# Patient Record
Sex: MALE | Race: WHITE | Employment: FULL TIME | URBAN - METROPOLITAN AREA
[De-identification: names, ages, dates, MRNs, and addresses within clinical notes are randomized per-mention and may not be internally consistent; named-entity substitution may affect disease eponyms.]

---

## 2024-03-09 ENCOUNTER — HOSPITAL ENCOUNTER (EMERGENCY)
Age: 44
Discharge: HOME | End: 2024-03-09
Payer: COMMERCIAL

## 2024-03-09 ENCOUNTER — APPOINTMENT (OUTPATIENT)
Dept: GENERAL RADIOLOGY | Age: 44
End: 2024-03-09
Payer: COMMERCIAL

## 2024-03-09 VITALS
WEIGHT: 255 LBS | TEMPERATURE: 98.3 F | HEIGHT: 72 IN | OXYGEN SATURATION: 97 % | DIASTOLIC BLOOD PRESSURE: 73 MMHG | HEART RATE: 90 BPM | SYSTOLIC BLOOD PRESSURE: 127 MMHG | BODY MASS INDEX: 34.54 KG/M2 | RESPIRATION RATE: 16 BRPM

## 2024-03-09 DIAGNOSIS — S62.336A CLOSED DISPLACED FRACTURE OF NECK OF FIFTH METACARPAL BONE OF RIGHT HAND, INITIAL ENCOUNTER: ICD-10-CM

## 2024-03-09 DIAGNOSIS — S62.339A CLOSED BOXER'S FRACTURE, INITIAL ENCOUNTER: Primary | ICD-10-CM

## 2024-03-09 PROCEDURE — 73130 X-RAY EXAM OF HAND: CPT

## 2024-03-09 PROCEDURE — 29125 APPL SHORT ARM SPLINT STATIC: CPT

## 2024-03-09 PROCEDURE — 99283 EMERGENCY DEPT VISIT LOW MDM: CPT

## 2024-03-09 RX ORDER — IBUPROFEN 600 MG/1
600 TABLET ORAL ONCE
Status: DISCONTINUED | OUTPATIENT
Start: 2024-03-09 | End: 2024-03-09 | Stop reason: HOSPADM

## 2024-03-09 RX ORDER — ACETAMINOPHEN 500 MG
1000 TABLET ORAL ONCE
Status: DISCONTINUED | OUTPATIENT
Start: 2024-03-09 | End: 2024-03-09 | Stop reason: HOSPADM

## 2024-03-09 ASSESSMENT — PAIN - FUNCTIONAL ASSESSMENT: PAIN_FUNCTIONAL_ASSESSMENT: 0-10

## 2024-03-09 ASSESSMENT — PAIN DESCRIPTION - ORIENTATION: ORIENTATION: RIGHT

## 2024-03-09 ASSESSMENT — PAIN SCALES - GENERAL: PAINLEVEL_OUTOF10: 7

## 2024-03-09 ASSESSMENT — PAIN DESCRIPTION - LOCATION: LOCATION: HAND

## 2024-03-09 ASSESSMENT — PAIN DESCRIPTION - PAIN TYPE: TYPE: ACUTE PAIN

## 2024-03-09 NOTE — ED NOTES
Applied an orthoglass ocl ulnar-gutter splint to the patient's injured right hand/forearm. Constructed the splint using 9\" of 3\" width orthoglass. Applied a generous layer of stockinette and webrils underneath for padding and patient comfort. Secured splint in a position of function using one 2\" and one 3\" ACE wraps. Checked CMS before and after application; remained intact. Proceeded to place affected arm in a [facility] sling. Patient tolerated all intervention well. Patient verbalizes understanding of all at home care instructions for splint and sling and had no additional questions or concerns.

## 2024-03-09 NOTE — DISCHARGE INSTRUCTIONS
X-ray shows fracture of the distal fifth metacarpal dominant right hand.  Splint applied.  Gentle upward pressure on the distal aspect.  Contact orthopedic Monday for an appointment early next week.  Ibuprofen 600 mg every 6 or 8 hours and may add Tylenol 1000 mg every 6 or 8 hours for additional pain control.  Elevate and apply ice.

## 2024-03-09 NOTE — ED PROVIDER NOTES
[facility]  ED  EMERGENCY DEPARTMENT ENCOUNTER        Pt Name: Dayton Sauer  MRN: [id number]  Birthdate 1980  Date of evaluation: 3/9/2024  Provider: Mc Barraza PA-C  PCP: No primary care provider on file.  Note Started: 8:41 AM EST 3/9/24      TIKA. I have evaluated this patient.        CHIEF COMPLAINT       Chief Complaint   Patient presents with    Hand Injury     Punched a wall       HISTORY OF PRESENT ILLNESS: 1 or more Elements     History From: Patient    Dayton Sauer is a 43 y.o. male who presents to the emergency department with wife and son.  Patient with complaint punched wall roughly 9 PM last night.  Pain distal fifth metacarpal dominant right hand.  Swelling noted.  No prior history of fracture or injury to this hand.  No other related complaints.  He comes in for evaluation.    Nursing Notes were all reviewed and agreed with or any disagreements were addressed in the HPI.    REVIEW OF SYSTEMS :      Review of Systems    Positives and Pertinent negatives as per HPI.     SURGICAL HISTORY   History reviewed. No pertinent surgical history.    CURRENTMEDICATIONS       There are no discharge medications for this patient.      ALLERGIES     Erythromycin    FAMILYHISTORY     History reviewed. No pertinent family history.     SOCIAL HISTORY       Social History     Tobacco Use    Smoking status: Former     Types: Cigarettes   Substance Use Topics    Alcohol use: Yes     Comment: social    Drug use: Not Currently       SCREENINGS                                   CIWA Assessment  BP: 127/73  Pulse: 90             PHYSICAL EXAM  1 or more Elements     ED Triage Vitals [03/09/24 0811]   BP Temp Temp Source Pulse Respirations SpO2 Height Weight - Scale   127/73 98.3 °F (36.8 °C) Oral 90 16 97 % 1.829 m (6') 115.7 kg (255 lb)       Physical Exam  Vitals and nursing note reviewed.   Constitutional:       Appearance: Normal appearance. He is well-developed. He is obese.   HENT:      diagnosis and treatment plan.      I am the Primary Clinician of Record.  FINAL IMPRESSION      1. Closed boxer's fracture, initial encounter    2. Closed displaced fracture of neck of fifth metacarpal bone of right hand, initial encounter          DISPOSITION/PLAN     DISPOSITION Decision To Discharge 03/09/2024 08:43:04 AM      PATIENT REFERRED TO:  Natali Barbour MD  [address]  [facility] [address]  [phone]    Schedule an appointment as soon as possible for a visit in 3 days      Your healthcare provider    Schedule an appointment as soon as possible for a visit   As needed    [facility]  ED  [address] [facility] [address] [phone]  Go to   If symptoms worsen      DISCHARGE MEDICATIONS:  There are no discharge medications for this patient.      DISCONTINUED MEDICATIONS:  There are no discharge medications for this patient.             (Please note that portions of this note were completed with a voice recognition program.  Efforts were made to edit the dictations but occasionally words are mis-transcribed.)    Mc Barraza PA-C (electronically signed)        Mc Barraza PA-C  03/09/24 1128

## 2024-03-11 ENCOUNTER — TELEPHONE (OUTPATIENT)
Dept: ORTHOPEDIC SURGERY | Age: 44
End: 2024-03-11

## 2024-03-12 ENCOUNTER — TELEPHONE (OUTPATIENT)
Dept: ORTHOPEDIC SURGERY | Age: 44
End: 2024-03-12

## 2024-03-12 ENCOUNTER — PREP FOR PROCEDURE (OUTPATIENT)
Dept: ORTHOPEDIC SURGERY | Age: 44
End: 2024-03-12

## 2024-03-12 ENCOUNTER — OFFICE VISIT (OUTPATIENT)
Dept: ORTHOPEDIC SURGERY | Age: 44
End: 2024-03-12
Payer: COMMERCIAL

## 2024-03-12 VITALS — BODY MASS INDEX: 34.54 KG/M2 | WEIGHT: 255 LBS | HEIGHT: 72 IN

## 2024-03-12 DIAGNOSIS — S62.316A CLOSED DISPLACED FRACTURE OF BASE OF FIFTH METACARPAL BONE OF RIGHT HAND, INITIAL ENCOUNTER: Primary | ICD-10-CM

## 2024-03-12 PROBLEM — S62.306A FRACTURE OF FIFTH METACARPAL BONE OF RIGHT HAND: Status: ACTIVE | Noted: 2024-03-12

## 2024-03-12 PROCEDURE — L3809 WHFO W/O JOINTS PRE OTS: HCPCS | Performed by: ORTHOPAEDIC SURGERY

## 2024-03-12 PROCEDURE — 1036F TOBACCO NON-USER: CPT | Performed by: ORTHOPAEDIC SURGERY

## 2024-03-12 PROCEDURE — 99204 OFFICE O/P NEW MOD 45 MIN: CPT | Performed by: ORTHOPAEDIC SURGERY

## 2024-03-12 PROCEDURE — G8427 DOCREV CUR MEDS BY ELIG CLIN: HCPCS | Performed by: ORTHOPAEDIC SURGERY

## 2024-03-12 PROCEDURE — G8417 CALC BMI ABV UP PARAM F/U: HCPCS | Performed by: ORTHOPAEDIC SURGERY

## 2024-03-12 PROCEDURE — G8484 FLU IMMUNIZE NO ADMIN: HCPCS | Performed by: ORTHOPAEDIC SURGERY

## 2024-03-12 NOTE — PROGRESS NOTES
educated and fit by a healthcare professional with expert knowledge and specialization in brace application while under the direct supervision of the physician.  Verbal and written instructions for the use of and application of this item were provided.   They were instructed to contact the office immediately should the brace result in increased pain, decreased sensation, increased swelling or worsening of the condition.       Natali Barbour MD

## 2024-03-13 ENCOUNTER — TELEPHONE (OUTPATIENT)
Dept: ADMINISTRATIVE | Age: 44
End: 2024-03-13

## 2024-03-13 NOTE — TELEPHONE ENCOUNTER
Auth: # [id number]     Date Range: 3/14/2024 - 6/12/2024  Type of SX:  Outpatient  Location: [facility]  CPT: 09288   DX Code: S62.306A  SX area: Right Hand  Insurance: [facility]

## 2024-03-14 ENCOUNTER — ANESTHESIA (OUTPATIENT)
Dept: OPERATING ROOM | Age: 44
End: 2024-03-14
Payer: COMMERCIAL

## 2024-03-14 ENCOUNTER — HOSPITAL ENCOUNTER (OUTPATIENT)
Age: 44
Setting detail: OUTPATIENT SURGERY
Discharge: HOME | End: 2024-03-14
Attending: ORTHOPAEDIC SURGERY | Admitting: ORTHOPAEDIC SURGERY
Payer: COMMERCIAL

## 2024-03-14 ENCOUNTER — APPOINTMENT (OUTPATIENT)
Dept: GENERAL RADIOLOGY | Age: 44
End: 2024-03-14
Attending: ORTHOPAEDIC SURGERY
Payer: COMMERCIAL

## 2024-03-14 ENCOUNTER — ANESTHESIA EVENT (OUTPATIENT)
Dept: OPERATING ROOM | Age: 44
End: 2024-03-14
Payer: COMMERCIAL

## 2024-03-14 VITALS
DIASTOLIC BLOOD PRESSURE: 81 MMHG | RESPIRATION RATE: 11 BRPM | SYSTOLIC BLOOD PRESSURE: 115 MMHG | BODY MASS INDEX: 34.13 KG/M2 | HEART RATE: 73 BPM | TEMPERATURE: 97.5 F | WEIGHT: 252 LBS | OXYGEN SATURATION: 93 % | HEIGHT: 72 IN

## 2024-03-14 DIAGNOSIS — S62.326A CLOSED DISPLACED FRACTURE OF SHAFT OF FIFTH METACARPAL BONE OF RIGHT HAND, INITIAL ENCOUNTER: Primary | ICD-10-CM

## 2024-03-14 PROCEDURE — 2580000003 HC RX 258: Performed by: ANESTHESIOLOGY

## 2024-03-14 PROCEDURE — 2500000003 HC RX 250 WO HCPCS: Performed by: NURSE ANESTHETIST, CERTIFIED REGISTERED

## 2024-03-14 PROCEDURE — 7100000010 HC PHASE II RECOVERY - FIRST 15 MIN: Performed by: ORTHOPAEDIC SURGERY

## 2024-03-14 PROCEDURE — 3700000001 HC ADD 15 MINUTES (ANESTHESIA): Performed by: ORTHOPAEDIC SURGERY

## 2024-03-14 PROCEDURE — 6360000002 HC RX W HCPCS: Performed by: NURSE ANESTHETIST, CERTIFIED REGISTERED

## 2024-03-14 PROCEDURE — 73120 X-RAY EXAM OF HAND: CPT

## 2024-03-14 PROCEDURE — C1713 ANCHOR/SCREW BN/BN,TIS/BN: HCPCS | Performed by: ORTHOPAEDIC SURGERY

## 2024-03-14 PROCEDURE — 2720000010 HC SURG SUPPLY STERILE: Performed by: ORTHOPAEDIC SURGERY

## 2024-03-14 PROCEDURE — 2580000003 HC RX 258: Performed by: ORTHOPAEDIC SURGERY

## 2024-03-14 PROCEDURE — 3700000000 HC ANESTHESIA ATTENDED CARE: Performed by: ORTHOPAEDIC SURGERY

## 2024-03-14 PROCEDURE — 2709999900 HC NON-CHARGEABLE SUPPLY: Performed by: ORTHOPAEDIC SURGERY

## 2024-03-14 PROCEDURE — 6360000002 HC RX W HCPCS: Performed by: ANESTHESIOLOGY

## 2024-03-14 PROCEDURE — 6370000000 HC RX 637 (ALT 250 FOR IP): Performed by: ANESTHESIOLOGY

## 2024-03-14 PROCEDURE — 7100000000 HC PACU RECOVERY - FIRST 15 MIN: Performed by: ORTHOPAEDIC SURGERY

## 2024-03-14 PROCEDURE — 6360000002 HC RX W HCPCS: Performed by: ORTHOPAEDIC SURGERY

## 2024-03-14 PROCEDURE — 3600000014 HC SURGERY LEVEL 4 ADDTL 15MIN: Performed by: ORTHOPAEDIC SURGERY

## 2024-03-14 PROCEDURE — A4217 STERILE WATER/SALINE, 500 ML: HCPCS | Performed by: ORTHOPAEDIC SURGERY

## 2024-03-14 PROCEDURE — 3600000004 HC SURGERY LEVEL 4 BASE: Performed by: ORTHOPAEDIC SURGERY

## 2024-03-14 PROCEDURE — 7100000011 HC PHASE II RECOVERY - ADDTL 15 MIN: Performed by: ORTHOPAEDIC SURGERY

## 2024-03-14 PROCEDURE — 7100000001 HC PACU RECOVERY - ADDTL 15 MIN: Performed by: ORTHOPAEDIC SURGERY

## 2024-03-14 DEVICE — 2.3 M VARIAX HAND LOCK T-PLATE, NARROW
Type: IMPLANTABLE DEVICE | Site: HAND | Status: FUNCTIONAL
Brand: VARIAX

## 2024-03-14 DEVICE — BONE SCREW, T6
Type: IMPLANTABLE DEVICE | Site: HAND | Status: FUNCTIONAL
Brand: VARIAX

## 2024-03-14 DEVICE — LOCKING SCREW, T6
Type: IMPLANTABLE DEVICE | Site: HAND | Status: FUNCTIONAL
Brand: VARIAX

## 2024-03-14 RX ORDER — SCOLOPAMINE TRANSDERMAL SYSTEM 1 MG/1
1 PATCH, EXTENDED RELEASE TRANSDERMAL
Status: DISCONTINUED | OUTPATIENT
Start: 2024-03-14 | End: 2024-03-14 | Stop reason: HOSPADM

## 2024-03-14 RX ORDER — SODIUM CHLORIDE 9 MG/ML
INJECTION, SOLUTION INTRAVENOUS PRN
Status: DISCONTINUED | OUTPATIENT
Start: 2024-03-14 | End: 2024-03-14 | Stop reason: HOSPADM

## 2024-03-14 RX ORDER — SODIUM CHLORIDE 0.9 % (FLUSH) 0.9 %
5-40 SYRINGE (ML) INJECTION EVERY 12 HOURS SCHEDULED
Status: DISCONTINUED | OUTPATIENT
Start: 2024-03-14 | End: 2024-03-14 | Stop reason: HOSPADM

## 2024-03-14 RX ORDER — MIDAZOLAM HYDROCHLORIDE 1 MG/ML
INJECTION INTRAMUSCULAR; INTRAVENOUS PRN
Status: DISCONTINUED | OUTPATIENT
Start: 2024-03-14 | End: 2024-03-14 | Stop reason: SDUPTHER

## 2024-03-14 RX ORDER — SODIUM CHLORIDE 0.9 % (FLUSH) 0.9 %
5-40 SYRINGE (ML) INJECTION PRN
Status: DISCONTINUED | OUTPATIENT
Start: 2024-03-14 | End: 2024-03-14 | Stop reason: HOSPADM

## 2024-03-14 RX ORDER — ONDANSETRON 2 MG/ML
4 INJECTION INTRAMUSCULAR; INTRAVENOUS
Status: COMPLETED | OUTPATIENT
Start: 2024-03-14 | End: 2024-03-14

## 2024-03-14 RX ORDER — PROPOFOL 10 MG/ML
INJECTION, EMULSION INTRAVENOUS PRN
Status: DISCONTINUED | OUTPATIENT
Start: 2024-03-14 | End: 2024-03-14 | Stop reason: SDUPTHER

## 2024-03-14 RX ORDER — HYDROMORPHONE HYDROCHLORIDE 2 MG/ML
0.5 INJECTION, SOLUTION INTRAMUSCULAR; INTRAVENOUS; SUBCUTANEOUS EVERY 5 MIN PRN
Status: COMPLETED | OUTPATIENT
Start: 2024-03-14 | End: 2024-03-14

## 2024-03-14 RX ORDER — DEXAMETHASONE SODIUM PHOSPHATE 4 MG/ML
INJECTION, SOLUTION INTRA-ARTICULAR; INTRALESIONAL; INTRAMUSCULAR; INTRAVENOUS; SOFT TISSUE PRN
Status: DISCONTINUED | OUTPATIENT
Start: 2024-03-14 | End: 2024-03-14 | Stop reason: SDUPTHER

## 2024-03-14 RX ORDER — TRAMADOL HYDROCHLORIDE 50 MG/1
50 TABLET ORAL EVERY 6 HOURS PRN
Qty: 12 TABLET | Refills: 0 | Status: SHIPPED | OUTPATIENT
Start: 2024-03-14 | End: 2024-03-17

## 2024-03-14 RX ORDER — ACETAMINOPHEN 500 MG
1000 TABLET ORAL ONCE
Status: COMPLETED | OUTPATIENT
Start: 2024-03-14 | End: 2024-03-14

## 2024-03-14 RX ORDER — MEPERIDINE HYDROCHLORIDE 25 MG/ML
12.5 INJECTION INTRAMUSCULAR; INTRAVENOUS; SUBCUTANEOUS EVERY 5 MIN PRN
Status: DISCONTINUED | OUTPATIENT
Start: 2024-03-14 | End: 2024-03-14 | Stop reason: HOSPADM

## 2024-03-14 RX ORDER — ONDANSETRON 2 MG/ML
INJECTION INTRAMUSCULAR; INTRAVENOUS PRN
Status: DISCONTINUED | OUTPATIENT
Start: 2024-03-14 | End: 2024-03-14 | Stop reason: SDUPTHER

## 2024-03-14 RX ORDER — BUPIVACAINE HYDROCHLORIDE 5 MG/ML
INJECTION, SOLUTION EPIDURAL; INTRACAUDAL
Status: COMPLETED | OUTPATIENT
Start: 2024-03-14 | End: 2024-03-14

## 2024-03-14 RX ORDER — OXYCODONE HYDROCHLORIDE 5 MG/1
5 TABLET ORAL
Status: COMPLETED | OUTPATIENT
Start: 2024-03-14 | End: 2024-03-14

## 2024-03-14 RX ORDER — LIDOCAINE HYDROCHLORIDE 20 MG/ML
INJECTION, SOLUTION INFILTRATION; PERINEURAL PRN
Status: DISCONTINUED | OUTPATIENT
Start: 2024-03-14 | End: 2024-03-14 | Stop reason: SDUPTHER

## 2024-03-14 RX ORDER — CEPHALEXIN 500 MG/1
500 CAPSULE ORAL 4 TIMES DAILY
Qty: 20 CAPSULE | Refills: 0 | Status: SHIPPED | OUTPATIENT
Start: 2024-03-14 | End: 2024-03-19

## 2024-03-14 RX ORDER — NALOXONE HYDROCHLORIDE 0.4 MG/ML
INJECTION, SOLUTION INTRAMUSCULAR; INTRAVENOUS; SUBCUTANEOUS PRN
Status: DISCONTINUED | OUTPATIENT
Start: 2024-03-14 | End: 2024-03-14 | Stop reason: HOSPADM

## 2024-03-14 RX ORDER — FENTANYL CITRATE 50 UG/ML
INJECTION, SOLUTION INTRAMUSCULAR; INTRAVENOUS PRN
Status: DISCONTINUED | OUTPATIENT
Start: 2024-03-14 | End: 2024-03-14 | Stop reason: SDUPTHER

## 2024-03-14 RX ORDER — HYDROMORPHONE HYDROCHLORIDE 2 MG/ML
0.25 INJECTION, SOLUTION INTRAMUSCULAR; INTRAVENOUS; SUBCUTANEOUS EVERY 5 MIN PRN
Status: COMPLETED | OUTPATIENT
Start: 2024-03-14 | End: 2024-03-14

## 2024-03-14 RX ADMIN — HYDROMORPHONE HYDROCHLORIDE 0.5 MG: 2 INJECTION, SOLUTION INTRAMUSCULAR; INTRAVENOUS; SUBCUTANEOUS at 08:24

## 2024-03-14 RX ADMIN — OXYCODONE 5 MG: 5 TABLET ORAL at 08:51

## 2024-03-14 RX ADMIN — FENTANYL CITRATE 25 MCG: 50 INJECTION, SOLUTION INTRAMUSCULAR; INTRAVENOUS at 07:44

## 2024-03-14 RX ADMIN — ACETAMINOPHEN 1000 MG: 500 TABLET ORAL at 06:40

## 2024-03-14 RX ADMIN — SODIUM CHLORIDE: 9 INJECTION, SOLUTION INTRAVENOUS at 06:42

## 2024-03-14 RX ADMIN — DEXAMETHASONE SODIUM PHOSPHATE 4 MG: 4 INJECTION, SOLUTION INTRAMUSCULAR; INTRAVENOUS at 07:37

## 2024-03-14 RX ADMIN — LIDOCAINE HYDROCHLORIDE 100 MG: 20 INJECTION, SOLUTION INFILTRATION; PERINEURAL at 07:37

## 2024-03-14 RX ADMIN — HYDROMORPHONE HYDROCHLORIDE 0.25 MG: 2 INJECTION, SOLUTION INTRAMUSCULAR; INTRAVENOUS; SUBCUTANEOUS at 08:39

## 2024-03-14 RX ADMIN — HYDROMORPHONE HYDROCHLORIDE 0.5 MG: 2 INJECTION, SOLUTION INTRAMUSCULAR; INTRAVENOUS; SUBCUTANEOUS at 08:30

## 2024-03-14 RX ADMIN — FENTANYL CITRATE 25 MCG: 50 INJECTION, SOLUTION INTRAMUSCULAR; INTRAVENOUS at 07:42

## 2024-03-14 RX ADMIN — ONDANSETRON 4 MG: 2 INJECTION INTRAMUSCULAR; INTRAVENOUS at 07:37

## 2024-03-14 RX ADMIN — ONDANSETRON 4 MG: 2 INJECTION INTRAMUSCULAR; INTRAVENOUS at 08:41

## 2024-03-14 RX ADMIN — HYDROMORPHONE HYDROCHLORIDE 0.25 MG: 2 INJECTION, SOLUTION INTRAMUSCULAR; INTRAVENOUS; SUBCUTANEOUS at 08:44

## 2024-03-14 RX ADMIN — CEFAZOLIN 2000 MG: 2 INJECTION, POWDER, FOR SOLUTION INTRAMUSCULAR; INTRAVENOUS at 06:43

## 2024-03-14 RX ADMIN — FENTANYL CITRATE 25 MCG: 50 INJECTION, SOLUTION INTRAMUSCULAR; INTRAVENOUS at 07:41

## 2024-03-14 RX ADMIN — MIDAZOLAM 2 MG: 1 INJECTION INTRAMUSCULAR; INTRAVENOUS at 07:32

## 2024-03-14 RX ADMIN — PROPOFOL 200 MG: 10 INJECTION, EMULSION INTRAVENOUS at 07:37

## 2024-03-14 RX ADMIN — FENTANYL CITRATE 25 MCG: 50 INJECTION, SOLUTION INTRAMUSCULAR; INTRAVENOUS at 07:43

## 2024-03-14 ASSESSMENT — PAIN DESCRIPTION - LOCATION
LOCATION: ARM

## 2024-03-14 ASSESSMENT — PAIN DESCRIPTION - DESCRIPTORS
DESCRIPTORS: SHARP
DESCRIPTORS: ACHING
DESCRIPTORS: SHARP
DESCRIPTORS: SHARP

## 2024-03-14 ASSESSMENT — PAIN - FUNCTIONAL ASSESSMENT
PAIN_FUNCTIONAL_ASSESSMENT: 0-10
PAIN_FUNCTIONAL_ASSESSMENT: PREVENTS OR INTERFERES WITH MANY ACTIVE NOT PASSIVE ACTIVITIES

## 2024-03-14 ASSESSMENT — PAIN DESCRIPTION - ORIENTATION
ORIENTATION: RIGHT

## 2024-03-14 ASSESSMENT — PAIN SCALES - GENERAL
PAINLEVEL_OUTOF10: 6
PAINLEVEL_OUTOF10: 8
PAINLEVEL_OUTOF10: 5
PAINLEVEL_OUTOF10: 8
PAINLEVEL_OUTOF10: 5
PAINLEVEL_OUTOF10: 6

## 2024-03-14 ASSESSMENT — ENCOUNTER SYMPTOMS: SHORTNESS OF BREATH: 0

## 2024-03-14 NOTE — H&P
Preoperative H&P Update    The patient's History and Physical in the medical record from 3/12/2024 was reviewed by me today.    Past Medical History:   Diagnosis Date    Asthma     AS A CHILD     Past Surgical History:   Procedure Laterality Date    DENTAL SURGERY       No current facility-administered medications on file prior to encounter.     Current Outpatient Medications on File Prior to Encounter   Medication Sig Dispense Refill    Multiple Vitamin (MULTIVITAMIN ADULT PO) Take by mouth         Allergies   Allergen Reactions    Erythromycin       I reviewed the HPI, medications, allergies, reason for surgery, diagnosis and treatment plan and there has been no change.    The patient was evaluated by me today. Physical exam findings for this update include:    Vitals:    03/14/24 0625   BP: 122/81   Pulse: 75   Resp: 16   Temp: 97.9 °F (36.6 °C)   SpO2: 96%     Airway is intact  Chest: chest clear, no wheezing, rales, normal symmetric air entry, no tachypnea, retractions or cyanosis  Heart: regular rate and rhythm ; heart sounds normal  Findings on exam of the body region where surgery is to be performed include:  Right hand pain/ 5th MC shaft displaced fracture.     Electronically signed by Natali Barbour MD on 3/14/2024 at 6:50 AM

## 2024-03-14 NOTE — PROGRESS NOTES
Discharge instructions review with patient and wife. All home medications have been reviewed, pt v/u. Discharge instructions signed. Pt discharged via wheelchair. Pt discharged with dressing CDI, medications given to wife from pharmacy and all belongings. wife taking stable pt home.     
Pt arrived from OR to PACU bay 5. Reported received from OR RN/CRNA staff. Pt  arousable to voice. Surgical incisions dressings in place to right hand. Pt on RA, NSR, and VSS. Will continue to monitor.    
Pt awake and alert at this time. Pt on RA, and VSS. Pt tolerating PO. Stated pain was imprmoved Skin warm to right arm, palpable pulses and able to wiggle fingers. Pt meets criteria to be discharged from Phase 1.      
Teaching / education initiated regarding perioperative experience, expectations, and pain management during stay. Patient verbalized understanding.    
and insurance card.             19.  Visit our web site for additional information:  [email or web address]              20.During flu season no children under the age of 14 are permitted in the hospital for the safety of all patients.                              21. If you take a long acting insulin in the evening only  take half of your usual  dose the night  before your procedure              22. If you use a c-pap please bring DOS if staying overnight,             23.For your convenience [facility] has a pharmacy on site to fill your prescriptions.             24. If you use oxygen and have a portable tank please bring it  with you the DOS             25. Bring a complete list of all your medications with name and dose include any supplements.             26. Other__________________________________________   *Please call pre admission testing if you any further questions   [place]         [phone]   [place] [phone]   [place]            [phone]    [facility]  [phone]   [facility]   [phone]       VISITOR POLICY(subject to change)    Current policy is 2 visitors per patient. No children. Mask is  at the discretion of the facility. Visiting hours are 8a-8p. Overnight visitors will be at the discretion of the nurse. All policies subject to change.      All above information reviewed with patient in person or by phone.Patient verbalizes understanding.All questions and concerns addressed.                                                                                                 Patient/Rep___PATIENT_________________                                                                                                                                    PRE OP INSTRUCTIONS

## 2024-03-14 NOTE — ANESTHESIA PRE PROCEDURE
Department of Anesthesiology  Preprocedure Note       Name:  Dayton Sauer   Age:  43 y.o.  :  1980                                          MRN:  [id number]         Date:  3/14/2024      Surgeon: Surgeon(s):  Natali Barbour MD    Procedure: Procedure(s):  RIGHT FIFTH METACARPAL OPEN REDUCTION INTERNAL FIXATION-SEDRICK    Medications prior to admission:   Prior to Admission medications    Medication Sig Start Date End Date Taking? Authorizing Provider   Multiple Vitamin (MULTIVITAMIN ADULT PO) Take by mouth   Yes Provider, MD Kris       Current medications:    Current Facility-Administered Medications   Medication Dose Route Frequency Provider Last Rate Last Admin    scopolamine (TRANSDERM-SCOP) transdermal patch 1 patch  1 patch TransDERmal Q72H Hardy Connor MD           Allergies:    Allergies   Allergen Reactions    Erythromycin        Problem List:    Patient Active Problem List   Diagnosis Code    Closed displaced fracture of base of fifth metacarpal bone of right hand S62.316A    Fracture of fifth metacarpal bone of right hand S62.306A       Past Medical History:        Diagnosis Date    Asthma     AS A CHILD       Past Surgical History:        Procedure Laterality Date    DENTAL SURGERY         Social History:    Social History     Tobacco Use    Smoking status: Former     Types: Cigarettes    Smokeless tobacco: Never   Substance Use Topics    Alcohol use: Yes     Comment: social                                Counseling given: Not Answered      Vital Signs (Current):   Vitals:    24 0818 24 0625   BP:  122/81   Pulse:  75   Resp:  16   Temp:  97.9 °F (36.6 °C)   TempSrc:  Tympanic   SpO2:  96%   Weight: 115.7 kg (255 lb) 114.3 kg (252 lb)   Height: 1.829 m (6') 1.829 m (6')                                              BP Readings from Last 3 Encounters:   24 122/81   24 127/73       NPO Status:

## 2024-03-14 NOTE — BRIEF OP NOTE
Brief Postoperative Note      Patient: Dayton Sauer  YOB: 1980  MRN: [id number]    Date of Procedure: 3/14/2024    Pre-Op Diagnosis Codes:     * Fracture of fifth metacarpal bone of right hand [S62.306A]    Post-Op Diagnosis: Same       Procedure(s):  RIGHT FIFTH METACARPAL OPEN REDUCTION INTERNAL FIXATION-SEDRICK    Surgeon(s):  Natali Barbour MD    Assistant: DEJON Tineo    Anesthesia: General    Estimated Blood Loss (mL): Minimal    Complications: None    Specimens:   * No specimens in log *    Implants:  Implant Name Type Inv. Item Serial No.  Lot No. LRB No. Used Action   PLATE BNE M H1.5MM 6 H HND TI DMITRY T BATSHEVA FOR 2.3MM SCR - [id number]  PLATE BNE M H1.5MM 6 H HND TI DMITRY T BATSHEVA FOR 2.3MM SCR  [employer] [facility]  Right 1 Implanted   SCREW BNE L11MM DIA2.3MM T6 VAR[employer] - [id number]  SCREW BNE L11MM DIA2.3MM T6 [employer]  [employer] [facility]  Right 1 Implanted   SCREW BONE VARIAX 2.3MM 9MM LK TI T6 DRIVE - [id number]  SCREW BONE VARIAX 2.3MM 9MM LK TI T6 DRIVE  [employer] [facility]  Right 2 Implanted   SCREW BNE L10MM DIA2.3MM T6 [employer] - [id number]  SCREW BNE L10MM DIA2.3MM T6 [employer]X  [employer] [facility]  Right 1 Implanted   SCREW BONE VARIAX LK 2.3X11MM - [id number]  SCREW BONE VARIAX LK 2.3X11MM  [employer] [facility]  Right 2 Implanted         Drains: * No LDAs found *    Findings: Same.      Electronically signed by Natali Barbour MD on 3/14/2024 at 6:20 PM

## 2024-03-14 NOTE — DISCHARGE INSTRUCTIONS
Post op instruction:  1- D/C home  2- Dx Right hand pain/ 5th MC shaft displaced fracture.   3- NWB right hand  4- Elevation surgical site, with ice  5- Keep splint dry and clean  6- F/U in 2 weeks.       Natali Barbour MD, 3/14/2024        ANESTHESIA DISCHARGE INSTRUCTIONS    Wear your seatbelt home.  You are under the influence of drugs-do not drink alcohol,drive,operate machinery,or make any important decisions or sign any legal documentsfor 24 hours  A responsible adult needs to be with you for 24 hours.  You may experience lightheadedness,dizziness,or sleepiness following surgery.  Rest at home today- increase activity as tolerated.  Progress slowly to a regular diet unless your physician has instructed you otherwise.Drink plenty of water.  If nausea becomes a problem call your physician.  Coughing,sore throat,and muscle aches are other side effects of anesthesia,and should improve with time.  Do not drive,operate machinery while taking narcotics.       GENERAL SURGERY DISCHARGE INSTRUCTIONS    Follow your surgeons instructions.  Follow up with your surgeon as directed.  Observe the operative area for signs of excessive bleeding.If needed apply pressure,elevate if able and contact your surgeon.  Observe the operative site for any signs of infection- such as increased pain,redness,fever greater than 101 degrees,swelling, foul odor or drainage.Contact your surgeon if any of these symptoms are present.  Keep operative site clean and dry.  Do not remove dressing unless instructed to by surgeon.  Apply ice as directed.  Avoid pulling,pushing or tugging to suture line.  If you become short of breath call your doctor or go to the ER.  Take medications as directed.  Pain medication should be taken with food.  Do not drive or operate machinery while taking narcotics.  For any problems or question call your surgeon.

## 2024-03-14 NOTE — ANESTHESIA POSTPROCEDURE EVALUATION
Department of Anesthesiology  Postprocedure Note    Patient: Dayton Sauer  MRN: [id number]  YOB: 1980  Date of evaluation: 3/14/2024    Procedure Summary       Date: 03/14/24 Room / Location: [address]    Anesthesia Start: 0733 Anesthesia Stop: 0821    Procedure: RIGHT FIFTH METACARPAL OPEN REDUCTION INTERNAL FIXATION-SEDRICK (Right: Hand) Diagnosis:       Fracture of fifth metacarpal bone of right hand      (Fracture of fifth metacarpal bone of right hand [S62.306A])    Surgeons: Natali Barbour MD Responsible Provider: Hardy Connor MD    Anesthesia Type: general ASA Status: 2            Anesthesia Type: No value filed.    Zoraida Phase I: Zoraida Score: 10    Zoraida Phase II: Zoraida Score: 10    Anesthesia Post Evaluation    Patient location during evaluation: PACU  Patient participation: complete - patient participated  Level of consciousness: awake  Airway patency: patent  Nausea & Vomiting: no nausea and no vomiting  Cardiovascular status: hemodynamically stable  Respiratory status: acceptable  Hydration status: stable  Multimodal analgesia pain management approach  Pain management: adequate    No notable events documented.

## 2024-03-15 ENCOUNTER — TELEPHONE (OUTPATIENT)
Dept: ORTHOPEDIC SURGERY | Age: 44
End: 2024-03-15

## 2024-03-15 NOTE — OP NOTE
area.  Careful dissection was performed.  We identified and protected the extensor tendons.  The fracture was exposed, found to be markedly displaced.  *** fracture site.  We were able to reduce anatomically.  While maintaining the reduction, we used a T-shaped 2.3 Tritanium locking plate from the [place].  We put plate in the appropriate position in the dorsal aspect of the 5th metacarpal.  We put 1 locking screw distally *** in the shaft.  At this point, we confirmed with the mini C-arm that the fracture was anatomically reduced and the plate was in good position.  We added 2 more screws in the shaft and added 2 more locking screws distally.  Overall, we were very satisfied with the anatomic reduction and position of all of these screws.  At this point, we let the tourniquet down.  Hemostasis was secured.  We irrigated the knee copiously with normal saline mixed with gentamicin.  We closed the subcu with a 3-0 Vicryl and the skin with a 4-0 Monocryl.  Steri-Strips were then applied.  Dressing was then applied with Xeroform, 4x4s, sterile Webril, and an ulnar gutter splint was applied.    The patient tolerated the procedure well and was taken to the Recovery in stable condition.    POSTOPERATIVE PLAN:  The patient will be discharged home.  He is nonweightbearing for at least 6 weeks.  After that, we can start range of motion in 2 weeks.          ADIS DILLON MD      D:  03/14/2024 18:36:47     T:  03/14/2024 23:31:58     /GAIL  Job #:  [id number]     Doc#:  [id number]

## 2024-03-15 NOTE — TELEPHONE ENCOUNTER
Other PATIENT CALLED TO REQUEST A RETURN TO WORK NOTE STATING HE HAD SURGERY WITH SURGERY DATE. PLS INCLUDE LEFT HAND DUTY ONLY SINCE SURGERY WAS ON THE RIGHT AND FOR HOW LONG. CAN THIS BE EMAILED TO PATIENT. PLS CALL TO ADVISE. [phone] / [email or web address]

## 2024-03-18 ENCOUNTER — TELEPHONE (OUTPATIENT)
Dept: ORTHOPEDIC SURGERY | Age: 44
End: 2024-03-18

## 2024-03-18 NOTE — TELEPHONE ENCOUNTER
General Question     Subject: [employer]UDENTIAL SENDING OVER SHORT TERM DISABILITY FORMS - PLEASE LOOK FOR THEM  Patient and /or Facility Request: Dayton Sauer \"Tate\"   Contact Number: [phone]     Pt's EMPLOYER STATES HE NEEDS TO BE ON SHORT TERM DISABILITY AND THEY'RE SENDING OVER HIS FORMS. PLEASE LOOK FOR THEM AND COMPLETE, ASAP, PLEASE?

## 2024-03-20 ENCOUNTER — TELEPHONE (OUTPATIENT)
Dept: ORTHOPEDIC SURGERY | Age: 44
End: 2024-03-20

## 2024-03-26 ENCOUNTER — TELEPHONE (OUTPATIENT)
Dept: ORTHOPEDIC SURGERY | Age: 44
End: 2024-03-26

## 2024-03-26 ENCOUNTER — OFFICE VISIT (OUTPATIENT)
Dept: ORTHOPEDIC SURGERY | Age: 44
End: 2024-03-26

## 2024-03-26 VITALS — BODY MASS INDEX: 34.13 KG/M2 | WEIGHT: 252 LBS | HEIGHT: 72 IN

## 2024-03-26 DIAGNOSIS — S62.316A CLOSED DISPLACED FRACTURE OF BASE OF FIFTH METACARPAL BONE OF RIGHT HAND, INITIAL ENCOUNTER: Primary | ICD-10-CM

## 2024-03-26 PROCEDURE — 99024 POSTOP FOLLOW-UP VISIT: CPT | Performed by: NURSE PRACTITIONER

## 2024-03-26 PROCEDURE — APPNB15 APP NON BILLABLE TIME 0-15 MINS: Performed by: NURSE PRACTITIONER

## 2024-03-26 NOTE — TELEPHONE ENCOUNTER
General Question     Subject: SHORT TERM DISABILITY PAPERWORK  Patient and /or Facility Request: Dayton Sauer \"Tate\"   Contact Number:  [phone]     PATIENT CALLING REGARDING SHORT TERM PAPERWORK WILL BE SENT OVER TO BE FILLED OUT.    PATIENT JUST WANTED TO MAKE THE STAFF THAT PAPERWORK IS COMING.

## 2024-03-26 NOTE — PROGRESS NOTES
DIAGNOSIS:  Right 5th MC shaft fracture, status post ORIF.    DATE OF SURGERY: 3/14/2024.    HISTORY OF PRESENT ILLNESS:  Mr. Sauer 43 y.o.  male right handed returns today  for 2 weeks postoperative visit.  The patient denies any significant pain in the right hand.Rates pain a 0/10 VAS and is doing very well.  No numbness or tingling sensation. No fever or Chills. He  is in a splint.  He has job requiring heavy lifting and was initially sent back with no use of the right hand, but his job site would not allow that, so he is now off work.  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  Denies smoking.    PHYSICAL EXAMINATION:  The incision is healing well.  No signs of any erythema or drainage. He has no pain with the active or passive range of motion of the right hand, but decrease ROM.  He  has intact sensation, distally, and he  is neurovascularly intact.    IMAGING:  Three views right hand showed anatomic alignment of right 5th MC, plate and screws in good position, no loosening.      IMPRESSION:  2 weeks out from right 5th MC ORIF and doing very well.    PLAN:  I have told the patient to work on ROM, as well as strengthening exercises. A removable wrist brace applied. No heavy impact activities.  Rest, ice and elevation.  The patient will come back for a follow up in 6 weeks.  At that time, we will take 3 views of the right hand. PT if needed then.    Off work for the next 4 weeks.      Kassidy Tineo, APRN - CNP

## 2024-05-07 ENCOUNTER — TELEPHONE (OUTPATIENT)
Dept: ORTHOPEDIC SURGERY | Age: 44
End: 2024-05-07

## 2024-05-07 ENCOUNTER — OFFICE VISIT (OUTPATIENT)
Dept: ORTHOPEDIC SURGERY | Age: 44
End: 2024-05-07

## 2024-05-07 DIAGNOSIS — S62.316A CLOSED DISPLACED FRACTURE OF BASE OF FIFTH METACARPAL BONE OF RIGHT HAND, INITIAL ENCOUNTER: Primary | ICD-10-CM

## 2024-05-07 PROCEDURE — 99024 POSTOP FOLLOW-UP VISIT: CPT | Performed by: ORTHOPAEDIC SURGERY

## 2024-05-07 NOTE — TELEPHONE ENCOUNTER
Patient is cleared to be full duty no restrictions.   Will need to place letter and  call patient to update.

## 2024-05-07 NOTE — TELEPHONE ENCOUNTER
General Question     Subject: R HAND RESTRICTION/ LETTER   Patient and /or Facility Request: Dayton Sauer \"Tate\"   Contact Number: [phone]     PATIENT CALLED IN TO SEE IF HE HAS ANY WORK RESTRICTION. LOOKING TO RETURN TO WORK AND NEED A LETTER THIS WEEK.. PATIENT REQ A CALL BACK...      PLEASE ADVISE

## 2024-05-07 NOTE — PROGRESS NOTES
DIAGNOSIS:  Right 5th MC shaft fracture, status post ORIF.    DATE OF SURGERY: 3/14/2024.    HISTORY OF PRESENT ILLNESS:  Mr. Sauer 43 y.o.  male right handed returns today  for 2 months postoperative visit.  The patient denies any significant pain in the right hand.Rates pain a 0/10 VAS and is doing very well.  No numbness or tingling sensation. No fever or Chills.  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  Denies smoking.    PHYSICAL EXAMINATION:  The incision is healing well.  No signs of any erythema or drainage. He has no pain with the active or passive range of motion of the right hand, minimal decrease ROM.  He  has intact sensation, distally, and he  is neurovascularly intact.    IMAGING:  Three views right hand showed anatomic alignment of right 5th MC, plate and screws in good position, no loosening.      IMPRESSION:  2 months out from right 5th MC ORIF and doing very well.    PLAN:  I have told the patient to work on ROM, as well as strengthening exercises. No heavy impact activities.  Rest, ice and elevation.  The patient will come back for a follow up in 6 weeks.  At that time, we will take 3 views of the right hand. PT if needed then.      Natali Barbour MD

## (undated) DEVICE — MEDICINE CUP, GRADUATED, STER: Brand: MEDLINE

## (undated) DEVICE — SYRINGE IRRIG 60ML SFT PLIABLE BLB EZ TO GRP 1 HND USE W/

## (undated) DEVICE — GLOVE SURG SZ 65 THK91MIL LTX FREE SYN POLYISOPRENE

## (undated) DEVICE — GLOVE ORANGE PI 8   MSG9080

## (undated) DEVICE — SHEET,DRAPE,53X77,STERILE: Brand: MEDLINE

## (undated) DEVICE — SUTURE VCRL + SZ 3-0 L18IN ABSRB UD SH 1/2 CIR TAPERCUT NDL VCP864D

## (undated) DEVICE — SOLUTION IRRIG 500ML 0.9% SOD CHLO USP POUR PLAS BTL

## (undated) DEVICE — SUTURE MCRYL + SZ 4-0 L27IN ABSRB UD L19MM PS-2 3/8 CIR MCP426H

## (undated) DEVICE — APPLICATOR MEDICATED 26 CC SOLUTION HI LT ORNG CHLORAPREP

## (undated) DEVICE — PACK PROCEDURE SURG EXTREMITY MFFOP CUST

## (undated) DEVICE — TOWEL,OR,DSP,ST,BLUE,STD,4/PK,20PK/CS: Brand: MEDLINE

## (undated) DEVICE — GAUZE,SPONGE,4"X4",8PLY,STRL,LF,10/TRAY: Brand: MEDLINE

## (undated) DEVICE — STERILE VELCLOSE ELASTIC BANDAGE, 4IN: Brand: VELCLOSE

## (undated) DEVICE — SYRINGE, LUER LOCK, 10ML: Brand: MEDLINE

## (undated) DEVICE — INTENDED FOR TISSUE SEPARATION, AND OTHER PROCEDURES THAT REQUIRE A SHARP SURGICAL BLADE TO PUNCTURE OR CUT.: Brand: BARD-PARKER ® STAINLESS STEEL BLADES

## (undated) DEVICE — ELECTRODE PT RET AD L9FT HI MOIST COND ADH HYDRGEL CORDED

## (undated) DEVICE — HYPODERMIC SAFETY NEEDLE: Brand: MAGELLAN

## (undated) DEVICE — C-ARM: Brand: UNBRANDED

## (undated) DEVICE — DRILL, WL 27MM, AO-SHAFT: Brand: PROFYLE

## (undated) DEVICE — GLOVE ORTHO 8   MSG9480

## (undated) DEVICE — GLOVE SURG SZ 65 L12IN THK75MIL DK GRN LTX FREE